# Patient Record
Sex: FEMALE | Race: ASIAN | NOT HISPANIC OR LATINO | URBAN - METROPOLITAN AREA
[De-identification: names, ages, dates, MRNs, and addresses within clinical notes are randomized per-mention and may not be internally consistent; named-entity substitution may affect disease eponyms.]

---

## 2019-12-29 ENCOUNTER — EMERGENCY (EMERGENCY)
Facility: HOSPITAL | Age: 64
LOS: 1 days | Discharge: HOME | End: 2019-12-29
Attending: EMERGENCY MEDICINE
Payer: MEDICARE

## 2019-12-29 VITALS
WEIGHT: 134.92 LBS | HEART RATE: 80 BPM | DIASTOLIC BLOOD PRESSURE: 80 MMHG | SYSTOLIC BLOOD PRESSURE: 152 MMHG | RESPIRATION RATE: 18 BRPM | OXYGEN SATURATION: 97 % | TEMPERATURE: 98 F

## 2019-12-29 LAB
ALBUMIN SERPL ELPH-MCNC: 4.4 G/DL — SIGNIFICANT CHANGE UP (ref 3.3–5.2)
ALP SERPL-CCNC: 42 U/L — SIGNIFICANT CHANGE UP (ref 40–120)
ALT FLD-CCNC: 141 U/L — HIGH
ANION GAP SERPL CALC-SCNC: 15 MMOL/L — SIGNIFICANT CHANGE UP (ref 5–17)
APTT BLD: 28.3 SEC — SIGNIFICANT CHANGE UP (ref 27.5–36.3)
AST SERPL-CCNC: 76 U/L — HIGH
BASOPHILS # BLD AUTO: 0.04 K/UL — SIGNIFICANT CHANGE UP (ref 0–0.2)
BASOPHILS NFR BLD AUTO: 0.4 % — SIGNIFICANT CHANGE UP (ref 0–2)
BILIRUB SERPL-MCNC: 1 MG/DL — SIGNIFICANT CHANGE UP (ref 0.4–2)
BUN SERPL-MCNC: 13 MG/DL — SIGNIFICANT CHANGE UP (ref 8–20)
CALCIUM SERPL-MCNC: 9 MG/DL — SIGNIFICANT CHANGE UP (ref 8.6–10.2)
CHLORIDE SERPL-SCNC: 102 MMOL/L — SIGNIFICANT CHANGE UP (ref 98–107)
CO2 SERPL-SCNC: 22 MMOL/L — SIGNIFICANT CHANGE UP (ref 22–29)
CREAT SERPL-MCNC: 0.56 MG/DL — SIGNIFICANT CHANGE UP (ref 0.5–1.3)
EOSINOPHIL # BLD AUTO: 0.02 K/UL — SIGNIFICANT CHANGE UP (ref 0–0.5)
EOSINOPHIL NFR BLD AUTO: 0.2 % — SIGNIFICANT CHANGE UP (ref 0–6)
GLUCOSE SERPL-MCNC: 117 MG/DL — HIGH (ref 70–115)
HCT VFR BLD CALC: 41 % — SIGNIFICANT CHANGE UP (ref 34.5–45)
HGB BLD-MCNC: 13.8 G/DL — SIGNIFICANT CHANGE UP (ref 11.5–15.5)
IMM GRANULOCYTES NFR BLD AUTO: 0.3 % — SIGNIFICANT CHANGE UP (ref 0–1.5)
INR BLD: 0.98 RATIO — SIGNIFICANT CHANGE UP (ref 0.88–1.16)
LIDOCAIN IGE QN: 33 U/L — SIGNIFICANT CHANGE UP (ref 22–51)
LYMPHOCYTES # BLD AUTO: 1.92 K/UL — SIGNIFICANT CHANGE UP (ref 1–3.3)
LYMPHOCYTES # BLD AUTO: 18.9 % — SIGNIFICANT CHANGE UP (ref 13–44)
MCHC RBC-ENTMCNC: 31.7 PG — SIGNIFICANT CHANGE UP (ref 27–34)
MCHC RBC-ENTMCNC: 33.7 GM/DL — SIGNIFICANT CHANGE UP (ref 32–36)
MCV RBC AUTO: 94 FL — SIGNIFICANT CHANGE UP (ref 80–100)
MONOCYTES # BLD AUTO: 0.88 K/UL — SIGNIFICANT CHANGE UP (ref 0–0.9)
MONOCYTES NFR BLD AUTO: 8.7 % — SIGNIFICANT CHANGE UP (ref 2–14)
NEUTROPHILS # BLD AUTO: 7.27 K/UL — SIGNIFICANT CHANGE UP (ref 1.8–7.4)
NEUTROPHILS NFR BLD AUTO: 71.5 % — SIGNIFICANT CHANGE UP (ref 43–77)
PLATELET # BLD AUTO: 184 K/UL — SIGNIFICANT CHANGE UP (ref 150–400)
POTASSIUM SERPL-MCNC: 3.8 MMOL/L — SIGNIFICANT CHANGE UP (ref 3.5–5.3)
POTASSIUM SERPL-SCNC: 3.8 MMOL/L — SIGNIFICANT CHANGE UP (ref 3.5–5.3)
PROT SERPL-MCNC: 7.7 G/DL — SIGNIFICANT CHANGE UP (ref 6.6–8.7)
PROTHROM AB SERPL-ACNC: 11.3 SEC — SIGNIFICANT CHANGE UP (ref 10–12.9)
RBC # BLD: 4.36 M/UL — SIGNIFICANT CHANGE UP (ref 3.8–5.2)
RBC # FLD: 12.5 % — SIGNIFICANT CHANGE UP (ref 10.3–14.5)
SODIUM SERPL-SCNC: 139 MMOL/L — SIGNIFICANT CHANGE UP (ref 135–145)
TROPONIN T SERPL-MCNC: <0.01 NG/ML — SIGNIFICANT CHANGE UP (ref 0–0.06)
WBC # BLD: 10.16 K/UL — SIGNIFICANT CHANGE UP (ref 3.8–10.5)
WBC # FLD AUTO: 10.16 K/UL — SIGNIFICANT CHANGE UP (ref 3.8–10.5)

## 2019-12-29 PROCEDURE — 93010 ELECTROCARDIOGRAM REPORT: CPT

## 2019-12-29 PROCEDURE — 99284 EMERGENCY DEPT VISIT MOD MDM: CPT

## 2019-12-29 RX ORDER — LIDOCAINE 4 G/100G
5 CREAM TOPICAL ONCE
Refills: 0 | Status: COMPLETED | OUTPATIENT
Start: 2019-12-29 | End: 2019-12-29

## 2019-12-29 RX ORDER — FAMOTIDINE 10 MG/ML
20 INJECTION INTRAVENOUS ONCE
Refills: 0 | Status: COMPLETED | OUTPATIENT
Start: 2019-12-29 | End: 2019-12-29

## 2019-12-29 RX ORDER — ONDANSETRON 8 MG/1
4 TABLET, FILM COATED ORAL ONCE
Refills: 0 | Status: COMPLETED | OUTPATIENT
Start: 2019-12-29 | End: 2019-12-29

## 2019-12-29 RX ADMIN — FAMOTIDINE 20 MILLIGRAM(S): 10 INJECTION INTRAVENOUS at 23:19

## 2019-12-29 RX ADMIN — Medication 30 MILLILITER(S): at 23:17

## 2019-12-29 RX ADMIN — ONDANSETRON 4 MILLIGRAM(S): 8 TABLET, FILM COATED ORAL at 23:19

## 2019-12-29 RX ADMIN — LIDOCAINE 5 MILLILITER(S): 4 CREAM TOPICAL at 23:17

## 2019-12-29 NOTE — ED PROVIDER NOTE - CARE PROVIDER_API CALL
Harpreet Mcdonough ()  Surgical ICU  [address] [place] [address]  Phone: [phone]  Fax: [phone]  Follow Up Time:

## 2019-12-29 NOTE — ED PROVIDER NOTE - OBJECTIVE STATEMENT
65 y/o F pt presents to ED c/o R sided abdominal pain that began 10 hours PTA. Pt's daughter states pt took Ranitidine with no relief. Pt notes increased pain after eating rice today. Denies fever, chills, CP, SOB, and n/v/d. No further complaints at this time.

## 2019-12-29 NOTE — ED ADULT NURSE NOTE - CHPI ED NUR SYMPTOMS NEG
no vomiting/no diarrhea/no dysuria/no nausea/no fever/no hematuria/no chills/no abdominal distension/no blood in stool/no burning urination

## 2019-12-29 NOTE — ED PROVIDER NOTE - PATIENT PORTAL LINK FT
You can access the FollowMyHealth Patient Portal offered by [facility] by registering at the following website: [email or web address]. By joining [employer]’s FollowMyHealth portal, you will also be able to view your health information using other applications (apps) compatible with our system.

## 2019-12-29 NOTE — ED ADULT NURSE NOTE - OBJECTIVE STATEMENT
per patient and daughter, patient started having abdominal pain at 10 AM today, denies N/V/D, fever/chills,  symptoms, no blood in stool, 2 BMs noted today.

## 2019-12-29 NOTE — ED PROVIDER NOTE - CLINICAL SUMMARY MEDICAL DECISION MAKING FREE TEXT BOX
pt well appearing and states that her pain has resolved. CT with appendicitis. Surgery has seen and recommended surgery, but given the resolution of Sx and the fact that the pt wishes to avoid surgery if possible, surgery is agreeable with conservative mgt with Po Abx and very strong return precaution given

## 2019-12-30 ENCOUNTER — INPATIENT (INPATIENT)
Facility: HOSPITAL | Age: 64
LOS: 0 days | Discharge: HOME | DRG: 341 | End: 2019-12-31
Attending: SURGERY | Admitting: SURGERY
Payer: MEDICAID

## 2019-12-30 ENCOUNTER — TRANSCRIPTION ENCOUNTER (OUTPATIENT)
Age: 64
End: 2019-12-30

## 2019-12-30 ENCOUNTER — RESULT REVIEW (OUTPATIENT)
Age: 64
End: 2019-12-30

## 2019-12-30 VITALS
DIASTOLIC BLOOD PRESSURE: 79 MMHG | SYSTOLIC BLOOD PRESSURE: 123 MMHG | RESPIRATION RATE: 18 BRPM | TEMPERATURE: 99 F | OXYGEN SATURATION: 96 % | HEART RATE: 92 BPM

## 2019-12-30 VITALS — HEIGHT: 62 IN | WEIGHT: 130.07 LBS

## 2019-12-30 DIAGNOSIS — K35.80 UNSPECIFIED ACUTE APPENDICITIS: ICD-10-CM

## 2019-12-30 LAB
ALBUMIN SERPL ELPH-MCNC: 4.6 G/DL — SIGNIFICANT CHANGE UP (ref 3.3–5.2)
ALP SERPL-CCNC: 43 U/L — SIGNIFICANT CHANGE UP (ref 40–120)
ALT FLD-CCNC: 131 U/L — HIGH
ANION GAP SERPL CALC-SCNC: 15 MMOL/L — SIGNIFICANT CHANGE UP (ref 5–17)
APTT BLD: 29.4 SEC — SIGNIFICANT CHANGE UP (ref 27.5–36.3)
AST SERPL-CCNC: 63 U/L — HIGH
BASOPHILS # BLD AUTO: 0.04 K/UL — SIGNIFICANT CHANGE UP (ref 0–0.2)
BASOPHILS NFR BLD AUTO: 0.5 % — SIGNIFICANT CHANGE UP (ref 0–2)
BILIRUB SERPL-MCNC: 0.9 MG/DL — SIGNIFICANT CHANGE UP (ref 0.4–2)
BLD GP AB SCN SERPL QL: SIGNIFICANT CHANGE UP
BUN SERPL-MCNC: 13 MG/DL — SIGNIFICANT CHANGE UP (ref 8–20)
CALCIUM SERPL-MCNC: 9.1 MG/DL — SIGNIFICANT CHANGE UP (ref 8.6–10.2)
CHLORIDE SERPL-SCNC: 99 MMOL/L — SIGNIFICANT CHANGE UP (ref 98–107)
CO2 SERPL-SCNC: 23 MMOL/L — SIGNIFICANT CHANGE UP (ref 22–29)
CREAT SERPL-MCNC: 0.61 MG/DL — SIGNIFICANT CHANGE UP (ref 0.5–1.3)
EOSINOPHIL # BLD AUTO: 0.02 K/UL — SIGNIFICANT CHANGE UP (ref 0–0.5)
EOSINOPHIL NFR BLD AUTO: 0.3 % — SIGNIFICANT CHANGE UP (ref 0–6)
GLUCOSE SERPL-MCNC: 109 MG/DL — SIGNIFICANT CHANGE UP (ref 70–115)
HCT VFR BLD CALC: 41.5 % — SIGNIFICANT CHANGE UP (ref 34.5–45)
HGB BLD-MCNC: 13.7 G/DL — SIGNIFICANT CHANGE UP (ref 11.5–15.5)
IMM GRANULOCYTES NFR BLD AUTO: 0.3 % — SIGNIFICANT CHANGE UP (ref 0–1.5)
INR BLD: 0.99 RATIO — SIGNIFICANT CHANGE UP (ref 0.88–1.16)
LYMPHOCYTES # BLD AUTO: 2.73 K/UL — SIGNIFICANT CHANGE UP (ref 1–3.3)
LYMPHOCYTES # BLD AUTO: 37.2 % — SIGNIFICANT CHANGE UP (ref 13–44)
MCHC RBC-ENTMCNC: 31.2 PG — SIGNIFICANT CHANGE UP (ref 27–34)
MCHC RBC-ENTMCNC: 33 GM/DL — SIGNIFICANT CHANGE UP (ref 32–36)
MCV RBC AUTO: 94.5 FL — SIGNIFICANT CHANGE UP (ref 80–100)
MONOCYTES # BLD AUTO: 0.54 K/UL — SIGNIFICANT CHANGE UP (ref 0–0.9)
MONOCYTES NFR BLD AUTO: 7.4 % — SIGNIFICANT CHANGE UP (ref 2–14)
NEUTROPHILS # BLD AUTO: 3.98 K/UL — SIGNIFICANT CHANGE UP (ref 1.8–7.4)
NEUTROPHILS NFR BLD AUTO: 54.3 % — SIGNIFICANT CHANGE UP (ref 43–77)
PLATELET # BLD AUTO: 189 K/UL — SIGNIFICANT CHANGE UP (ref 150–400)
POTASSIUM SERPL-MCNC: 3.6 MMOL/L — SIGNIFICANT CHANGE UP (ref 3.5–5.3)
POTASSIUM SERPL-SCNC: 3.6 MMOL/L — SIGNIFICANT CHANGE UP (ref 3.5–5.3)
PROT SERPL-MCNC: 8 G/DL — SIGNIFICANT CHANGE UP (ref 6.6–8.7)
PROTHROM AB SERPL-ACNC: 11.4 SEC — SIGNIFICANT CHANGE UP (ref 10–12.9)
RBC # BLD: 4.39 M/UL — SIGNIFICANT CHANGE UP (ref 3.8–5.2)
RBC # FLD: 12.5 % — SIGNIFICANT CHANGE UP (ref 10.3–14.5)
SODIUM SERPL-SCNC: 137 MMOL/L — SIGNIFICANT CHANGE UP (ref 135–145)
WBC # BLD: 7.33 K/UL — SIGNIFICANT CHANGE UP (ref 3.8–10.5)
WBC # FLD AUTO: 7.33 K/UL — SIGNIFICANT CHANGE UP (ref 3.8–10.5)

## 2019-12-30 PROCEDURE — 71045 X-RAY EXAM CHEST 1 VIEW: CPT | Mod: 26

## 2019-12-30 PROCEDURE — 99284 EMERGENCY DEPT VISIT MOD MDM: CPT

## 2019-12-30 PROCEDURE — 88304 TISSUE EXAM BY PATHOLOGIST: CPT | Mod: 26

## 2019-12-30 PROCEDURE — 74177 CT ABD & PELVIS W/CONTRAST: CPT | Mod: 26

## 2019-12-30 RX ORDER — ENOXAPARIN SODIUM 100 MG/ML
40 INJECTION SUBCUTANEOUS DAILY
Refills: 0 | Status: CANCELLED | OUTPATIENT
Start: 2019-12-31 | End: 2019-12-30

## 2019-12-30 RX ORDER — MORPHINE SULFATE 50 MG/1
1 CAPSULE, EXTENDED RELEASE ORAL EVERY 4 HOURS
Refills: 0 | Status: DISCONTINUED | OUTPATIENT
Start: 2019-12-30 | End: 2019-12-30

## 2019-12-30 RX ORDER — ONDANSETRON 8 MG/1
4 TABLET, FILM COATED ORAL EVERY 4 HOURS
Refills: 0 | Status: DISCONTINUED | OUTPATIENT
Start: 2019-12-30 | End: 2019-12-30

## 2019-12-30 RX ORDER — SODIUM CHLORIDE 9 MG/ML
1000 INJECTION, SOLUTION INTRAVENOUS ONCE
Refills: 0 | Status: COMPLETED | OUTPATIENT
Start: 2019-12-30 | End: 2019-12-30

## 2019-12-30 RX ORDER — CEFOTETAN DISODIUM 1 G
2 VIAL (EA) INJECTION ONCE
Refills: 0 | Status: COMPLETED | OUTPATIENT
Start: 2019-12-30 | End: 2019-12-30

## 2019-12-30 RX ORDER — ACETAMINOPHEN 500 MG
650 TABLET ORAL EVERY 6 HOURS
Refills: 0 | Status: DISCONTINUED | OUTPATIENT
Start: 2019-12-30 | End: 2019-12-30

## 2019-12-30 RX ORDER — SODIUM CHLORIDE 9 MG/ML
1000 INJECTION, SOLUTION INTRAVENOUS
Refills: 0 | Status: DISCONTINUED | OUTPATIENT
Start: 2019-12-30 | End: 2019-12-30

## 2019-12-30 RX ADMIN — SODIUM CHLORIDE 1000 MILLILITER(S): 9 INJECTION, SOLUTION INTRAVENOUS at 19:47

## 2019-12-30 RX ADMIN — SODIUM CHLORIDE 125 MILLILITER(S): 9 INJECTION, SOLUTION INTRAVENOUS at 22:21

## 2019-12-30 RX ADMIN — Medication 100 GRAM(S): at 06:34

## 2019-12-30 RX ADMIN — Medication 2 GRAM(S): at 20:20

## 2019-12-30 RX ADMIN — Medication 100 GRAM(S): at 19:49

## 2019-12-30 RX ADMIN — SODIUM CHLORIDE 1000 MILLILITER(S): 9 INJECTION, SOLUTION INTRAVENOUS at 20:49

## 2019-12-30 NOTE — ED STATDOCS - PHYSICAL EXAMINATION
Gen: No acute distress, non toxic  HEENT: Mucous membranes moist, pink conjunctivae, EOMI  CV: RRR  Resp: CTAB, normal rate and effort  GI: Abdomen soft, + RLQ TTP ND. No rebound, no guarding  Neuro: A&O x 3, moving all 4 extremities Gen: No acute distress, non toxic  HEENT: Mucous membranes moist, pink conjunctivae, EOMI  CV: RRR  Resp: CTAB, normal rate and effort  GI: Abdomen soft, + mild RLQ TTP ND. No rebound, no guarding  Neuro: A&O x 3, moving all 4 extremities

## 2019-12-30 NOTE — H&P ADULT - ASSESSMENT
65 yo F w/ CT findings consistent with acute appendicitis    NPO/IVF  Pain control  IV abx  Labs/Type and screen/Coags pending  OR for Laparoscopic appendectomy, possible open 65 yo F w/ CT findings consistent with acute appendicitis    NPO/IVF  Pain control  IV abx  Labs pending  Since on immunomodulator medications, will treat conservatively at this time with IV abx. If declines, will discuss surgical intervention.

## 2019-12-30 NOTE — ED STATDOCS - PROGRESS NOTE DETAILS
pt is seen by dr parks initially agreed with hx , PE and plan   pt is seen by surgery team now, recommend the IVF, cefotetan 2gr dose and labs , they will call back us

## 2019-12-30 NOTE — ED STATDOCS - NS ED ROS FT
ROS: No fever/chills.  No chest painNo SOB/cough/.  +abdominal pain, N/V/D,No dysuria/frequency.  No headache. No Dizziness.    No rashes  No numbness/weakness

## 2019-12-30 NOTE — ED STATDOCS - ATTENDING CONTRIBUTION TO CARE
Patient Eleanor: I performed a face to face bedside interview with patient regarding history of present illness, review of symptoms and past medical history. I completed an independent physical exam and ordered tests/medications as needed.  I have discussed patient's plan of care with advanced care provider. The advanced care provider assisted in  executing the discussed plan. I was available for any questions or issues that may have arose during the execution of the plan of care.

## 2019-12-30 NOTE — ED ADULT NURSE NOTE - OBJECTIVE STATEMENT
pt reports constant dull generalized non radiating abdominal pain. reports signed out ama recently after being dx with appendicitis. was sent home on po abx and was following up with [facility] surgery team. pt sent here for readmission as her pain has increased. sitting calm in bed. a and o x3. breathing even and unlabored. will continue to monitor.

## 2019-12-30 NOTE — CONSULT NOTE ADULT - ASSESSMENT
ASSESSMENT: Patient is a 64y old female with acute appendicitis who refused surgical intervention after the risks and benefits were explained. 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 Patient stated that she was feeling better and wanted to go home.     PLAN:    - Discharge with 10 day course of PO Augmentin   - f/u on ACS office as outpatient   - f/u on Vascular surgery office for SMA mural thrombus with Dr. Parker  - return to ED if symptoms return and/or gets febrile.

## 2019-12-30 NOTE — ED STATDOCS - OBJECTIVE STATEMENT
63 y/o F pt presents to ED with son at bedside c/o RLQ ABD pain sent to ED by PMD. Presented to [facility] 1 day ago, diagnosed with appendicitis, but left AMA on PO abx. Represents today c/o returning RLQ pain and will proceed with appendectomy as advised. Denies N/V/D, fever, chills. No further complaints at this time.

## 2019-12-30 NOTE — H&P ADULT - HISTORY OF PRESENT ILLNESS
HPI: 63 yo F w/ CC of abdominal pain. Onset: Yesterday around 10 am. Localized in the RLQ. Pain is intermittent, sharp, getting progressively worse. Denies nausea/vomiting. Last BM was yesterday - soft stools, non-bloody. Voiding at baseline, denies burning. +Subjective fevers, denies chills.     Of note, patient presented this morning to ED with similar symptoms. CT scan was done which revealed acute appendicitis. It was recommended that the patient receive a laparoscopic appendectomy, however, she declined and left the hospital. Now returning to the hospital because the pain was getting progressively worse.     Pmhx: RA  Pshx: Tubal ligation, Uterine fibroid excised  Meds: Steroids  Allergies: NKDA  Shx: Denies x 3

## 2019-12-30 NOTE — H&P ADULT - NSHPPHYSICALEXAM_GEN_ALL_CORE
Vital Signs Last 24 Hrs  T(C): 37.2 (30 Dec 2019 16:26), Max: 37.3 (30 Dec 2019 06:11)  T(F): 98.9 (30 Dec 2019 16:26), Max: 99.2 (30 Dec 2019 06:11)  HR: 76 (30 Dec 2019 16:26) (76 - 92)  BP: 146/84 (30 Dec 2019 16:26) (123/79 - 152/80)  BP(mean): --  RR: 18 (30 Dec 2019 16:26) (18 - 18)  SpO2: 96% (30 Dec 2019 16:26) (96% - 97%)    PE  Gen: AOX3, NAD  Pulm: Non labored breathing  CV: RRR, s1 and s2  Abd: Soft, ND, +TTP to RLQ, negative Rosvings, no rebound or guarding  Ext: Moving all 4 extremities  Vasc: +2 DP/PT pulses bilaterally  Neuro: Good affect

## 2019-12-30 NOTE — CONSULT NOTE ADULT - ATTENDING COMMENTS
pt presented with rlq pain earlier today, now has resolved avss  no n/v/f/c noted  pt is hungry currently .  abd soft nt nd  labs / imaging reviewed  plan  [facility] language phone : [phone].  I recommend surgery for appendicitis , all questions and concerns were discussed, she does not want surgery , but will accept non operative tx for appendicitis with augmentin 10days, and will f/u with her pmd for colonoscopy. considering her age there is a risk of CA being cause of appendicitis. she had colonoscopy 3 yrs prior .  also she has a incidental finding of SMA mural thrombus: vascular surgery needs to be consulted .

## 2019-12-30 NOTE — H&P ADULT - NSHPLABSRESULTS_GEN_ALL_CORE
I&O's Detail      LABS:                        13.8   10.16 )-----------( 184      ( 29 Dec 2019 23:24 )             41.0     12-29    139  |  102  |  13.0  ----------------------------<  117<H>  3.8   |  22.0  |  0.56    Ca    9.0      29 Dec 2019 23:24    TPro  7.7  /  Alb  4.4  /  TBili  1.0  /  DBili  x   /  AST  76<H>  /  ALT  141<H>  /  AlkPhos  42  12-29    PT/INR - ( 29 Dec 2019 23:24 )   PT: 11.3 sec;   INR: 0.98 ratio         PTT - ( 29 Dec 2019 23:24 )  PTT:28.3 sec      RADIOLOGY & ADDITIONAL STUDIES:

## 2019-12-30 NOTE — ED ADULT TRIAGE NOTE - CHIEF COMPLAINT QUOTE
c/o was here yesterday dx with appendicitis , pt sent home on antibiotics after symptoms resolved now back pain is worse

## 2019-12-30 NOTE — ED ADULT NURSE NOTE - CHPI ED NUR SYMPTOMS NEG
no fever/no vomiting/no diarrhea/no nausea/no hematuria/no blood in stool/no burning urination/no chills/no dysuria/no abdominal distension

## 2019-12-30 NOTE — ED STATDOCS - CLINICAL SUMMARY MEDICAL DECISION MAKING FREE TEXT BOX
Patient with appendicitis, left AMA this AM, treated with Augmentin. Returns c/o somewhat worsening pain and agreeable to surgery. Surgery consulted.

## 2019-12-30 NOTE — CONSULT NOTE ADULT - SUBJECTIVE AND OBJECTIVE BOX
ACUTE CARE SURGERY CONSULT     HPI: 64y Female with PMH of RA on steroids, s/p tubal ligation, and excision of uterine fibroids that presented today with RLQ pain that started at 10am and remained localized during the day. Patient did not vomit, felt nauseous, nor has any abnormal bowel movement. Patients last BM was yesterday afternoon. Last colonoscopy was 3-4 years ago and had no evidence of disease per patient. Upon arrival to ED pain resolved and now she does not have pain, even with palpation. It was explained the CT findings of acute appendicitis and that we recommended appendectomy but patient said that she felt better and refused surgery. The risks and benefits of surgery were explained and she understood. Everything was discussed in front of daughter in law who also understood and with the use of   ([id number]). Patient denied any pain, fever, chills, nausea, vomiting, chest pain, anorexia, and sob.     ROS: 10-system review is otherwise negative except HPI above.      PAST MEDICAL & SURGICAL HISTORY:  RA  Tubal ligation  Uterine fibroid excision      SOCIAL HISTORY:  Denied any toxic habits    ALLERGIES: NKA No Known Allergies      HOME MEDICATIONS:   Steroids    --------------------------------------------------------------------------------------------    PHYSICAL EXAM:   General: NAD, Lying in bed comfortably  Neuro: A+Ox3  HEENT: EOMI  Cardio: RRR   Resp: Non labored breathing   GI/Abd: Soft, NT/ND, no rebound/guarding, no masses palpated  Vascular: All 4 extremities warm.  Musculoskeletal: All 4 extremities moving spontaneously  --------------------------------------------------------------------------------------------    LABS                 13.8   10.16  )----------(  184       ( 29 Dec 2019 23:24 )               41.0      139    |  102    |  13.0   ----------------------------<  117        ( 29 Dec 2019 23:24 )  3.8     |  22.0   |  0.56     Ca    9.0        ( 29 Dec 2019 23:24 )    TPro  7.7    /  Alb  4.4    /  TBili  1.0    /  DBili  x      /  AST  76     /  ALT  141    /  AlkPhos  42     ( 29 Dec 2019 23:24 )    LIVER FUNCTIONS - ( 29 Dec 2019 23:24 )  Alb: 4.4 g/dL / Pro: 7.7 g/dL / ALK PHOS: 42 U/L / ALT: 141 U/L / AST: 76 U/L / GGT: x           PT/INR -  11.3 sec / 0.98 ratio   ( 29 Dec 2019 23:24 )       PTT -  28.3 sec   ( 29 Dec 2019 23:24 )  CAPILLARY BLOOD GLUCOSE    CARDIAC MARKERS ( 29 Dec 2019 23:24 )  x     / <0.01 ng/mL / x     / x     / x                --------------------------------------------------------------------------------------------  IMAGING  CT abd: FINDINGS:     LOWER CHEST: Within normal limits.     LIVER: Within normal limits.   BILE DUCTS: Normal caliber.   GALLBLADDER: Within normal limits.   SPLEEN: Within normal limits.   PANCREAS: Within normal limits.   ADRENALS: Within normal limits.   KIDNEYS/URETERS: Within normal limits.     BLADDER: Within normal limits.   REPRODUCTIVE ORGANS: Hysterectomy.     BOWEL: No bowel obstruction. Appendix is thick walled and dilated up to 12   mm. There is periappendiceal inflammatory change.   PERITONEUM: No free air, fluid collection, or ascites.   VESSELS: Moderate to severe eccentric mural thrombus in the proximal   superior mesenteric artery causing luminal stenosis. Aorta is of normal   caliber.   RETROPERITONEUM: No lymphadenopathy.   ABDOMINAL WALL: Within normal limits.   BONES: Within normal limits.     IMPRESSION: Acute appendicitis. No abscess.     Incidental note is made of moderate to severe eccentric mural thrombus   involving the proximal superior mesenteric artery causing luminal stenosis..

## 2019-12-30 NOTE — ED ADULT NURSE NOTE - INTERPRETER NAME
Patient w/ history of HTN but unsure of which medications she takes. As per outpatient records patient on Norvasc, HCTZ and Atenolol at home.   -will hold for now given severe sepsis picture  -pt on Lasix 20mg PO jonna

## 2019-12-31 ENCOUNTER — TRANSCRIPTION ENCOUNTER (OUTPATIENT)
Age: 64
End: 2019-12-31

## 2019-12-31 VITALS
RESPIRATION RATE: 17 BRPM | DIASTOLIC BLOOD PRESSURE: 69 MMHG | SYSTOLIC BLOOD PRESSURE: 109 MMHG | HEART RATE: 70 BPM | OXYGEN SATURATION: 91 % | TEMPERATURE: 99 F

## 2019-12-31 PROBLEM — Z00.00 ENCOUNTER FOR PREVENTIVE HEALTH EXAMINATION: Status: ACTIVE | Noted: 2019-12-31

## 2019-12-31 PROCEDURE — 96361 HYDRATE IV INFUSION ADD-ON: CPT

## 2019-12-31 PROCEDURE — 85027 COMPLETE CBC AUTOMATED: CPT

## 2019-12-31 PROCEDURE — 93975 VASCULAR STUDY: CPT | Mod: 26

## 2019-12-31 PROCEDURE — 36415 COLL VENOUS BLD VENIPUNCTURE: CPT

## 2019-12-31 PROCEDURE — 96374 THER/PROPH/DIAG INJ IV PUSH: CPT | Mod: XU

## 2019-12-31 PROCEDURE — 84484 ASSAY OF TROPONIN QUANT: CPT

## 2019-12-31 PROCEDURE — 86900 BLOOD TYPING SEROLOGIC ABO: CPT

## 2019-12-31 PROCEDURE — 83690 ASSAY OF LIPASE: CPT

## 2019-12-31 PROCEDURE — 88304 TISSUE EXAM BY PATHOLOGIST: CPT

## 2019-12-31 PROCEDURE — 74177 CT ABD & PELVIS W/CONTRAST: CPT

## 2019-12-31 PROCEDURE — 96375 TX/PRO/DX INJ NEW DRUG ADDON: CPT

## 2019-12-31 PROCEDURE — 71045 X-RAY EXAM CHEST 1 VIEW: CPT

## 2019-12-31 PROCEDURE — 86850 RBC ANTIBODY SCREEN: CPT

## 2019-12-31 PROCEDURE — 99285 EMERGENCY DEPT VISIT HI MDM: CPT | Mod: 25

## 2019-12-31 PROCEDURE — 44970 LAPAROSCOPY APPENDECTOMY: CPT

## 2019-12-31 PROCEDURE — 99284 EMERGENCY DEPT VISIT MOD MDM: CPT | Mod: 25

## 2019-12-31 PROCEDURE — 85610 PROTHROMBIN TIME: CPT

## 2019-12-31 PROCEDURE — 86901 BLOOD TYPING SEROLOGIC RH(D): CPT

## 2019-12-31 PROCEDURE — 96365 THER/PROPH/DIAG IV INF INIT: CPT

## 2019-12-31 PROCEDURE — 93975 VASCULAR STUDY: CPT

## 2019-12-31 PROCEDURE — 93005 ELECTROCARDIOGRAM TRACING: CPT

## 2019-12-31 PROCEDURE — 80053 COMPREHEN METABOLIC PANEL: CPT

## 2019-12-31 PROCEDURE — 85730 THROMBOPLASTIN TIME PARTIAL: CPT

## 2019-12-31 RX ORDER — ACETAMINOPHEN 500 MG
650 TABLET ORAL EVERY 6 HOURS
Refills: 0 | Status: DISCONTINUED | OUTPATIENT
Start: 2019-12-31 | End: 2019-12-31

## 2019-12-31 RX ORDER — METOCLOPRAMIDE HCL 10 MG
10 TABLET ORAL ONCE
Refills: 0 | Status: DISCONTINUED | OUTPATIENT
Start: 2019-12-31 | End: 2019-12-31

## 2019-12-31 RX ORDER — FENTANYL CITRATE 50 UG/ML
50 INJECTION INTRAVENOUS
Refills: 0 | Status: DISCONTINUED | OUTPATIENT
Start: 2019-12-31 | End: 2019-12-31

## 2019-12-31 RX ORDER — ONDANSETRON 8 MG/1
4 TABLET, FILM COATED ORAL ONCE
Refills: 0 | Status: DISCONTINUED | OUTPATIENT
Start: 2019-12-31 | End: 2019-12-31

## 2019-12-31 RX ORDER — LANOLIN ALCOHOL/MO/W.PET/CERES
1 CREAM (GRAM) TOPICAL
Qty: 0 | Refills: 0 | DISCHARGE
Start: 2019-12-31

## 2019-12-31 RX ORDER — RIVAROXABAN 15 MG-20MG
1 KIT ORAL
Qty: 30 | Refills: 0
Start: 2019-12-31

## 2019-12-31 RX ORDER — OMEGA-3 ACID ETHYL ESTERS 1 G
2 CAPSULE ORAL
Qty: 0 | Refills: 0 | DISCHARGE

## 2019-12-31 RX ORDER — TRAMADOL HYDROCHLORIDE 50 MG/1
1 TABLET ORAL
Qty: 12 | Refills: 0
Start: 2019-12-31

## 2019-12-31 RX ORDER — TRAMADOL HYDROCHLORIDE 50 MG/1
50 TABLET ORAL EVERY 4 HOURS
Refills: 0 | Status: DISCONTINUED | OUTPATIENT
Start: 2019-12-31 | End: 2019-12-31

## 2019-12-31 RX ORDER — RIVAROXABAN 15 MG-20MG
15 KIT ORAL
Refills: 0 | Status: DISCONTINUED | OUTPATIENT
Start: 2019-12-31 | End: 2019-12-31

## 2019-12-31 RX ORDER — RIVAROXABAN 15 MG-20MG
1 KIT ORAL
Qty: 1 | Refills: 0
Start: 2019-12-31

## 2019-12-31 RX ORDER — SODIUM CHLORIDE 9 MG/ML
1000 INJECTION, SOLUTION INTRAVENOUS
Refills: 0 | Status: DISCONTINUED | OUTPATIENT
Start: 2019-12-31 | End: 2019-12-31

## 2019-12-31 RX ORDER — ACETAMINOPHEN 500 MG
2 TABLET ORAL
Qty: 0 | Refills: 0 | DISCHARGE
Start: 2019-12-31

## 2019-12-31 RX ORDER — FENTANYL CITRATE 50 UG/ML
25 INJECTION INTRAVENOUS
Refills: 0 | Status: DISCONTINUED | OUTPATIENT
Start: 2019-12-31 | End: 2019-12-31

## 2019-12-31 RX ORDER — CEFOTETAN DISODIUM 1 G
2 VIAL (EA) INJECTION ONCE
Refills: 0 | Status: COMPLETED | OUTPATIENT
Start: 2019-12-31 | End: 2019-12-31

## 2019-12-31 RX ORDER — SARILUMAB 150 MG/1.14ML
0 INJECTION, SOLUTION SUBCUTANEOUS
Qty: 0 | Refills: 0 | DISCHARGE

## 2019-12-31 RX ADMIN — RIVAROXABAN 15 MILLIGRAM(S): KIT at 12:23

## 2019-12-31 RX ADMIN — RIVAROXABAN 15 MILLIGRAM(S): KIT at 18:32

## 2019-12-31 RX ADMIN — Medication 100 GRAM(S): at 06:19

## 2019-12-31 NOTE — DISCHARGE NOTE PROVIDER - CARE PROVIDER_API CALL
Marlene Garcia (MD)  Surgery; Vascular Surgery  [address] [place] [address]  Phone: [phone]  Fax: [phone]  Follow Up Time: Joe Robertson)  Surgery  [address] [facility], [address] [place] [address]  Phone: [phone]  Fax: [phone]  Follow Up Time:

## 2019-12-31 NOTE — DISCHARGE NOTE PROVIDER - NSDCCAREPROVSEEN_GEN_ALL_CORE_FT
65 yo F ia s/p laparoscopic appendectomy. Pt had an incidental finding on CT A/P which showed moderate to severe eccentric mural thrombus involving the proximal superior mesenteric artery.    AAOx3, ambulating, voiding, tolerating diet    AVSS    PUL: CTA  CV: RRR  GI: laparoscopic incisions and clean and closed. Appropriate incision tenderness    Plan:  1. F/u with SMA duplex as outpt with vascular surgery and  treat thrombus with Xarelto  2. Pain control as needed  3. stable to d/c to home        D/C < 30 min.

## 2019-12-31 NOTE — DISCHARGE NOTE PROVIDER - HOSPITAL COURSE
64F PMHx Rheumatoid Arthritis who presented for the 2nd time in one day with RLQ abdominal pain that started the day before and has become worse over time.  Initially seen the morning of admission where CT revealed acute appendicitis, pt. refused surgery and wanted to try to go home on trial of abx but pain continued to get worse so she returned for surgical intervention.  CT also incidentally revealed SMA mural thrombus.  Patient was admitted to undergo Laparoscopic Appendectomy.  Patient tolerated the procedure well and was transferred to the floor in stable condition.  On POD #1, the patients diet was advanced as tolerated and was placed on PO pain medication.  Once patient was ambulating well, voiding without difficulty, and tolerating a full diet, they were found to be stable for discharge to home.  Pain was well-controlled at time of discharge.  Also seen by Vascular service, Dr. Brandt.  Pt. placed on Xarelto and discharge home to f/u with them as outpatient.

## 2019-12-31 NOTE — PROGRESS NOTE ADULT - ATTENDING COMMENTS
63 yo F ia s/p laparoscopic appendectomy. Pt had an incidental finding on CT A/P which showed moderate to severe eccentric mural thrombus involving the proximal superior mesenteric artery.    AAOx3, ambulating, voiding, tolerating diet    AVSS    PUL: CTA  CV: RRR  GI: laparoscopic incisions and clean and closed. Appropriate incision tenderness    Plan:  1. F/u with SMA duplex and consider elaquist as tx.  2. Pain control as needed  3. stable to d/c to home        code [id number]

## 2019-12-31 NOTE — DISCHARGE NOTE NURSING/CASE MANAGEMENT/SOCIAL WORK - PATIENT PORTAL LINK FT
You can access the FollowMyHealth Patient Portal offered by [facility] by registering at the following website: [email or web address]. By joining [employer]’s FollowMyHealth portal, you will also be able to view your health information using other applications (apps) compatible with our system.

## 2019-12-31 NOTE — DISCHARGE NOTE PROVIDER - NSDCCPCAREPLAN_GEN_ALL_CORE_FT
PRINCIPAL DISCHARGE DIAGNOSIS  Diagnosis: Acute appendicitis, unspecified acute appendicitis type  Assessment and Plan of Treatment: FOLLOW-UP: Please follow up with Acute Care Surgery clinic on 01/09/20@11:15am regarding your surgery.  Please follow up with your primary care physician regarding your hospitalization    BATHING: Please do not submerge wound underwater. You may shower and/or sponge bathe.   ACTIVITY: No heavy lifting or straining. Otherwise, you may return to your usual level of physical activity. If you are taking narcotic pain medication (such as Percocet) DO NOT drive a car, operate machinery or make important decisions.  DIET: Return to your usual diet.  NOTIFY YOUR SURGEON IF: You have any bleeding that does not stop, any pus draining from your wound(s), any fever (over 100.4 F) or chills, persistent nausea/vomiting, persistent diarrhea, or if your pain is not controlled on your discharge pain medications.        SECONDARY DISCHARGE DIAGNOSES  Diagnosis: Superior mesenteric vein thrombosis  Assessment and Plan of Treatment: FOLLOW UP: Vascular Surgery, Dr. Parker as outpatient, call for an appointment upon discharge.  MEDICATION: You need to take the blood thinner Xarelto as directed until seen as an outpatient to discuss treatment plan going forward  RETURN TO THE ED: for any abdominal pain not relieved with pain meds, inability to tolerate diet, persisten nausea, vomiting, and/or diarrhea or any other concerns that you might have. PRINCIPAL DISCHARGE DIAGNOSIS  Diagnosis: Acute appendicitis, unspecified acute appendicitis type  Assessment and Plan of Treatment: FOLLOW-UP: Please follow up with Acute Care Surgery clinic on 01/09/20@11:15am regarding your surgery.  Please follow up with your primary care physician regarding your hospitalization    BATHING: Please do not submerge wound underwater. You may shower and/or sponge bathe.   ACTIVITY: No heavy lifting or straining. Otherwise, you may return to your usual level of physical activity. If you are taking narcotic pain medication (such as Percocet) DO NOT drive a car, operate machinery or make important decisions.  DIET: Return to your usual diet.  NOTIFY YOUR SURGEON IF: You have any bleeding that does not stop, any pus draining from your wound(s), any fever (over 100.4 F) or chills, persistent nausea/vomiting, persistent diarrhea, or if your pain is not controlled on your discharge pain medications.        SECONDARY DISCHARGE DIAGNOSES  Diagnosis: Superior mesenteric vein thrombosis  Assessment and Plan of Treatment: FOLLOW UP: Vascular Surgery, Dr. Robertson as outpatient, call for an appointment upon discharge.  Also please make an appointment to discuss this with your Rhuematologist as this may be a Vasculitis that is related to your Rheumatoid Arthritis.  MEDICATION: You need to take the blood thinner Xarelto as directed until seen as an outpatient to discuss treatment plan going forward  RETURN TO THE ED: for any abdominal pain not relieved with pain meds, inability to tolerate diet, persisten nausea, vomiting, and/or diarrhea or any other concerns that you might have.

## 2019-12-31 NOTE — DISCHARGE NOTE PROVIDER - NSDCMRMEDTOKEN_GEN_ALL_CORE_FT
acetaminophen 325 mg oral tablet: 2 tab(s) orally every 6 hours, As needed, Moderate Pain (4 - 6)  Fish Oil 1000 mg oral capsule: 2  orally 2 times a day  Kevzara Pre-filled Pen 200 mg/1.14 mL subcutaneous solution: pt was taking 2X/week. as per son in law PMD thinks injection is affecting pt&#x27;s kidney function  Melatonin 3 mg oral tablet: 1 tab(s) orally once a day (at bedtime)  Renal Caps oral capsule: 1 cap(s) orally once a day  rivaroxaban 15 mg-20 mg oral kit: 1 application orally once a day   traMADol 50 mg oral tablet: 1 tab(s) orally every 4 hours, As Needed -Severe Pain (7 - 10) MDD:4 tabs acetaminophen 325 mg oral tablet: 2 tab(s) orally every 6 hours, As needed, Moderate Pain (4 - 6)  Fish Oil 1000 mg oral capsule: 2  orally 2 times a day  Kevzara Pre-filled Pen 200 mg/1.14 mL subcutaneous solution: pt was taking 2X/week. as per son in law PMD thinks injection is affecting pt&#x27;s kidney function  Melatonin 3 mg oral tablet: 1 tab(s) orally once a day (at bedtime)  Renal Caps oral capsule: 1 cap(s) orally once a day  rivaroxaban 15 mg oral tablet: 1 tab(s) orally once a day MDD:1 tab  traMADol 50 mg oral tablet: 1 tab(s) orally every 4 hours, As Needed -Severe Pain (7 - 10) MDD:4 tabs

## 2019-12-31 NOTE — CONSULT NOTE ADULT - SUBJECTIVE AND OBJECTIVE BOX
Vascular Surgery Consult Note    HPI:65 yo F w/ CC of abdominal pain patient stated that this began around 10 am Sunday morning. Pain described as localized in the RLQ, intermittent, sharp, getting progressively worse. Denies nausea/vomiting. Last BM was Monday 12/30 - soft stools, non-bloody. Voiding at baseline, denies burning. +Subjective fevers, denies chills.     Of note, patient presented to ED Monday 12/30 morning with similar symptoms. CT scan was done which revealed acute appendicitis. It was recommended that the patient receive a laparoscopic appendectomy, however, she declined and left the hospital. Now returning to the hospital because the pain was getting progressively worse.       Vascular HPI:   Patient admitted under ACS now s/p lap appy, uncomplicated, nonperforated appendix. 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 Patient with no generalized abdominal complaints of acute presentation other than localized RLQ peritonitic pain associated with appendicitis. Likewise patient has not noted unintentional weight loss nor aversion to food.      ROS: 10-system review is otherwise negative except HPI above.      PAST MEDICAL & SURGICAL HISTORY:  No pertinent past medical history  No significant past surgical history    FAMILY HISTORY:    [] Family history not pertinent as reviewed with the patient and family    SOCIAL HISTORY:  denies toxic habits such as smoking, EtOH consumption     ALLERGIES: No Known Allergies      CURRENT MEDICATIONS  MEDICATIONS (STANDING): cefoTEtan  IVPB 2 Gram(s) IV Intermittent once  lactated ringers. 1000 milliLiter(s) IV Continuous <Continuous>  lactated ringers. 1000 milliLiter(s) IV Continuous <Continuous>  ondansetron Injectable 4 milliGRAM(s) IV Push once    MEDICATIONS (PRN):acetaminophen   Tablet .. 650 milliGRAM(s) Oral every 6 hours PRN Moderate Pain (4 - 6)  fentaNYL    Injectable 25 MICROGram(s) IV Push every 5 minutes PRN Moderate Pain (4 - 6)  fentaNYL    Injectable 50 MICROGram(s) IV Push every 10 minutes PRN Severe Pain (7 - 10)  metoclopramide Injectable 10 milliGRAM(s) IV Push once PRN Nausea and/or Vomiting  ondansetron Injectable 4 milliGRAM(s) IV Push once PRN Nausea and/or Vomiting  traMADol 50 milliGRAM(s) Oral every 4 hours PRN Severe Pain (7 - 10)    --------------------------------------------------------------------------------------------    Vitals:   T(C): 36.7 (12-31-19 @ 00:56), Max: 37.3 (12-30-19 @ 06:11)  HR: 66 (12-31-19 @ 02:45) (63 - 92)  BP: 110/67 (12-31-19 @ 02:45) (107/65 - 146/84)  RR: 17 (12-31-19 @ 02:45) (12 - 18)  SpO2: 98% (12-31-19 @ 02:45) (96% - 100%)  CAPILLARY BLOOD GLUCOSE        CAPILLARY BLOOD GLUCOSE          12-30 @ 07:01  -  12-31 @ 03:57  --------------------------------------------------------  IN:    lactated ringers.: 250 mL  Total IN: 250 mL    OUT:    Voided: 500 mL  Total OUT: 500 mL    Total NET: -250 mL        Height (cm): 157.48 (12-30 @ 16:26)  Weight (kg): 59 (12-30 @ 16:26)  BMI (kg/m2): 23.8 (12-30 @ 16:26)  BSA (m2): 1.59 (12-30 @ 16:26)    PHYSICAL EXAM:  General: NAD, Lying in bed comfortably  Neuro: A+Ox3  HEENT: NC/AT, EOMI  Neck: Soft, supple  Cardio: RRR, nml S1/S2  Resp: Good effort, CTA b/l  Thorax: No chest wall tenderness  GI/Abd: Soft, nondistended, s/p lap appy with 2 laparoscopic port sites. No epigastric tenderness.   Vascular: All 4 extremities warm.  Skin: Intact, no breakdown  Lymphatic/Nodes: No palpable lymphadenopathy  Musculoskeletal: All 4 extremities moving spontaneously, no limitations  --------------------------------------------------------------------------------------------    LABS  CBC (12-30 @ 20:20)                              13.7                           7.33    )----------------(  189        54.3  % Neutrophils, 37.2  % Lymphocytes, ANC: 3.98                                41.5    CBC (12-29 @ 23:24)                              13.8                           10.16   )----------------(  184        71.5  % Neutrophils, 18.9  % Lymphocytes, ANC: 7.27                                41.0      BMP (12-30 @ 20:20)             137     |  99      |  13.0  		Ca++ --      Ca 9.1                ---------------------------------( 109   		Mg --                 3.6     |  23.0    |  0.61  			Ph --      BMP (12-29 @ 23:24)             139     |  102     |  13.0  		Ca++ --      Ca 9.0                ---------------------------------( 117<H>		Mg --                 3.8     |  22.0    |  0.56  			Ph --        LFTs (12-30 @ 20:20)      TPro 8.0 / Alb 4.6 / TBili 0.9 / DBili -- / AST 63<H> / <H> / AlkPhos 43  LFTs (12-29 @ 23:24)      TPro 7.7 / Alb 4.4 / TBili 1.0 / DBili -- / AST 76<H> / <H> / AlkPhos 42    Coags (12-30 @ 20:20)  aPTT 29.4 / INR 0.99 / PT 11.4  Coags (12-29 @ 23:24)  aPTT 28.3 / INR 0.98 / PT 11.3          --------------------------------------------------------------------------------------------    MICROBIOLOGY      --------------------------------------------------------------------------------------------    IMAGING  < from: CT Abdomen and Pelvis w/ IV Cont (12.30.19 @ 04:22) >  Acute appendicitis. No abscess.    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    < end of copied text >

## 2019-12-31 NOTE — DISCHARGE NOTE PROVIDER - NSFOLLOWUPCLINICS_GEN_ALL_ED_FT
[facility] Acute Care Surgery  Acute Care Surgery  [address] [place] [id number]  Phone: [phone]  Fax:   Follow Up Time:

## 2019-12-31 NOTE — PROGRESS NOTE ADULT - ASSESSMENT
Assessment:  The patient is a 64 yr old Female who is now several hours post-op from an acute non perforated laparoscopic appendectomy. Pt had an incidental finding on CT A/P which showed moderate to severe eccentric mural thrombus involving the proximal superior mesenteric artery.    Plan:  -F/u with SMA duplex  -Pain control as needed  -OOB and ambulating   -IVF LR @125  -DVT ppx

## 2019-12-31 NOTE — PROGRESS NOTE ADULT - SUBJECTIVE AND OBJECTIVE BOX
POST-OP CHECK NOTE    Subjective:    Patient is a 64 yr old female s/p laparoscopic appendectomy yesterday. Recovering appropriately with no complaints of acute pain. Patient tolerated some water in the PACU with no difficulty. She is ambulating independently and is voiding adequate urine with no hesitancy or urgency. Patient denies dizziness, lightheadedness, nausea, vomiting, abdominal pain, chest pain, sob.      Vital Signs Last 24 Hrs  T(C): 38.1 (31 Dec 2019 04:03), Max: 38.1 (31 Dec 2019 04:03)  T(F): 100.6 (31 Dec 2019 04:03), Max: 100.6 (31 Dec 2019 04:03)  HR: 674 (31 Dec 2019 04:03) (63 - 674)  BP: 122/743 (31 Dec 2019 04:03) (107/65 - 146/84)  BP(mean): --  RR: 18 (31 Dec 2019 04:03) (12 - 18)  SpO2: 93% (31 Dec 2019 04:03) (93% - 100%)  I&O's Detail    30 Dec 2019 07:01  -  31 Dec 2019 05:48  --------------------------------------------------------  IN:    lactated ringers.: 250 mL  Total IN: 250 mL    OUT:    Voided: 500 mL  Total OUT: 500 mL    Total NET: -250 mL        cefoTEtan  IVPB 2  cefoTEtan  IVPB 2    PAST MEDICAL & SURGICAL HISTORY:  No pertinent past medical history  No significant past surgical history      Physical Exam:  General: NAD, resting comfortably in bed  Pulmonary: Nonlabored breathing, no respiratory distress, CTA b/l  Cardiovascular: NSR, S1 S2 heard  Abdominal: soft, 3 incisions CDI. Gauze has slight strike through, but non saturated. Pt complains of incisional tenderness to the RLQ 3/10.   No acute symptoms of tenderness stated.   Extremities: 2+ central and peripheral pulses, normal ROM.       LABS:                        13.7   7.33  )-----------( 189      ( 30 Dec 2019 20:20 )             41.5     12-30    137  |  99  |  13.0  ----------------------------<  109  3.6   |  23.0  |  0.61    Ca    9.1      30 Dec 2019 20:20    TPro  8.0  /  Alb  4.6  /  TBili  0.9  /  DBili  x   /  AST  63<H>  /  ALT  131<H>  /  AlkPhos  43  12-30    PT/INR - ( 30 Dec 2019 20:20 )   PT: 11.4 sec;   INR: 0.99 ratio         PTT - ( 30 Dec 2019 20:20 )  PTT:29.4 sec  CAPILLARY BLOOD GLUCOSE      Radiology and Additional Studies:

## 2019-12-31 NOTE — CONSULT NOTE ADULT - ASSESSMENT
ASSESSMENT: Patient is a 64y old f with acute appendicitis now s/p Lap Appy seen to have an incidental finding of SMA mural thrombus.     PLAN:   - Mesenteric arterial duplex, may need CTA if non-contributory   - evaluate for hyperlipemia   - Cardiac evaluation for possible cardiac disease  - Plan to be discussed with Attending, Dr. Robertson

## 2019-12-31 NOTE — DISCHARGE NOTE PROVIDER - CARE PROVIDERS DIRECT ADDRESSES
,DirectAddress_Unknown ,[email or web address]

## 2020-01-09 ENCOUNTER — APPOINTMENT (OUTPATIENT)
Dept: TRAUMA SURGERY | Facility: CLINIC | Age: 65
End: 2020-01-09

## 2020-01-11 LAB — SURGICAL PATHOLOGY STUDY: SIGNIFICANT CHANGE UP

## 2020-01-15 PROBLEM — Z78.9 OTHER SPECIFIED HEALTH STATUS: Chronic | Status: ACTIVE | Noted: 2019-12-30

## 2020-01-16 ENCOUNTER — APPOINTMENT (OUTPATIENT)
Dept: TRAUMA SURGERY | Facility: CLINIC | Age: 65
End: 2020-01-16
Payer: MEDICARE

## 2020-01-16 VITALS
BODY MASS INDEX: 26.66 KG/M2 | RESPIRATION RATE: 16 BRPM | HEIGHT: 59.5 IN | WEIGHT: 134 LBS | TEMPERATURE: 97.9 F | DIASTOLIC BLOOD PRESSURE: 81 MMHG | HEART RATE: 66 BPM | OXYGEN SATURATION: 98 % | SYSTOLIC BLOOD PRESSURE: 122 MMHG

## 2020-01-16 DIAGNOSIS — K35.80 UNSPECIFIED ACUTE APPENDICITIS: ICD-10-CM

## 2020-01-16 PROCEDURE — 99024 POSTOP FOLLOW-UP VISIT: CPT

## 2021-12-21 NOTE — PROGRESS NOTE ADULT - I WAS PHYSICALLY PRESENT FOR THE KEY PORTIONS OF THE EVALUATION AND MANAGEMENT (E/M) SERVICE PROVIDED.  I AGREE WITH THE ABOVE HISTORY, PHYSICAL, AND PLAN WHICH I HAVE REVIEWED AND EDITED WHERE APPROPRIATE
CC:  Sybil Regalado is here today for Office Visit and Medicare Wellness Visit (subsequent)       Medications: medications verified, no change  Refills needed today?  NO  denies Latex allergy or sensitivity  Patient would like communication of their results via:      Cell Phone:   Telephone Information:   Mobile [phone]     Okay to leave a message containing results? Yes  Tobacco history: verified  Advanced directives: Yes on file  Patient's current [facility] status: Active.  Health Maintenance Due   Topic Date Due   • COVID-19 Vaccine (3 - Moderna risk 4-dose series) 11/25/2021   • Medicare Wellness Visit  11/23/2021     Patient is due for topics as listed above but is not proceeding with Immunization(s) COVID-19 at this time. .          [facility] status addressed. Patient Active.       Statement Selected

## 2022-02-18 ENCOUNTER — EMERGENCY (EMERGENCY)
Facility: HOSPITAL | Age: 67
LOS: 1 days | Discharge: HOME | End: 2022-02-18
Admitting: EMERGENCY MEDICINE
Payer: MEDICARE

## 2022-02-18 PROCEDURE — 99284 EMERGENCY DEPT VISIT MOD MDM: CPT

## 2022-02-20 ENCOUNTER — EMERGENCY (EMERGENCY)
Facility: HOSPITAL | Age: 67
LOS: 1 days | Discharge: HOME | End: 2022-02-20
Admitting: EMERGENCY MEDICINE
Payer: MEDICARE

## 2022-02-20 DIAGNOSIS — R21 RASH AND OTHER NONSPECIFIC SKIN ERUPTION: ICD-10-CM

## 2022-02-20 DIAGNOSIS — L50.9 URTICARIA, UNSPECIFIED: ICD-10-CM

## 2022-02-20 PROCEDURE — 99284 EMERGENCY DEPT VISIT MOD MDM: CPT

## 2022-02-22 DIAGNOSIS — T78.40XA ALLERGY, UNSPECIFIED, INITIAL ENCOUNTER: ICD-10-CM
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
## 2022-02-22 DIAGNOSIS — R21 RASH AND OTHER NONSPECIFIC SKIN ERUPTION: ICD-10-CM

## 2023-03-27 NOTE — ED PROVIDER NOTE - DISPOSITION TYPE
DISCHARGE Propranolol Pregnancy And Lactation Text: This medication is Pregnancy Category C and it isn't known if it is safe during pregnancy. It is excreted in breast milk.
